# Patient Record
Sex: FEMALE | Race: BLACK OR AFRICAN AMERICAN | Employment: STUDENT | URBAN - METROPOLITAN AREA
[De-identification: names, ages, dates, MRNs, and addresses within clinical notes are randomized per-mention and may not be internally consistent; named-entity substitution may affect disease eponyms.]

---
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
## 2022-06-22 ENCOUNTER — OFFICE VISIT (OUTPATIENT)
Dept: URGENT CARE | Facility: URGENT CARE | Age: 22
End: 2022-06-22
Payer: COMMERCIAL

## 2022-06-22 VITALS
OXYGEN SATURATION: 100 % | RESPIRATION RATE: 22 BRPM | TEMPERATURE: 97.9 F | HEART RATE: 90 BPM | HEIGHT: 64 IN | BODY MASS INDEX: 39.27 KG/M2 | WEIGHT: 230 LBS

## 2022-06-22 DIAGNOSIS — J06.9 VIRAL URI: Primary | ICD-10-CM

## 2022-06-22 LAB
DEPRECATED S PYO AG THROAT QL EIA: NEGATIVE
GROUP A STREP BY PCR: NOT DETECTED

## 2022-06-22 PROCEDURE — U0003 INFECTIOUS AGENT DETECTION BY NUCLEIC ACID (DNA OR RNA); SEVERE ACUTE RESPIRATORY SYNDROME CORONAVIRUS 2 (SARS-COV-2) (CORONAVIRUS DISEASE [COVID-19]), AMPLIFIED PROBE TECHNIQUE, MAKING USE OF HIGH THROUGHPUT TECHNOLOGIES AS DESCRIBED BY CMS-2020-01-R: HCPCS | Performed by: FAMILY MEDICINE

## 2022-06-22 PROCEDURE — 87651 STREP A DNA AMP PROBE: CPT | Performed by: FAMILY MEDICINE

## 2022-06-22 PROCEDURE — U0005 INFEC AGEN DETEC AMPLI PROBE: HCPCS | Performed by: FAMILY MEDICINE

## 2022-06-22 PROCEDURE — 99203 OFFICE O/P NEW LOW 30 MIN: CPT | Mod: CS | Performed by: FAMILY MEDICINE

## 2022-06-22 NOTE — PROGRESS NOTES
Subjective: Patient woke up from a nap yesterday with a sore throat, hurts to swallow, felt a little weak last night.  Her mother is allergic to kiwi and she has had kiwi without problems in the past but she did have a popsicle with kiwi in it shortly before taking a nap.  She works in a  on the weekends, not aware of any illnesses at the .  No one at home is sick.  No cold symptoms at this point.    Objective: ENT with moderately red but not particularly swollen posterior pharynx.  Anterior nodes are a little tender.  Vitals are stable.  Strep test negative, COVID pending.    Assessment and plan: Likely a viral syndrome, rule out COVID.  Discussed what to expect.  Tylenol and ibuprofen for pain.

## 2022-06-23 LAB — SARS-COV-2 RNA RESP QL NAA+PROBE: NEGATIVE

## 2022-06-26 ENCOUNTER — NURSE TRIAGE (OUTPATIENT)
Dept: NURSING | Facility: CLINIC | Age: 22
End: 2022-06-26

## 2022-06-26 NOTE — TELEPHONE ENCOUNTER
"Headache and coughing. She thinks it's just a cold. Throat is getting worse. Test results came back normal.     Coronavirus (COVID-19) Notification    Lab Result   Lab test 2019-nCoV rRt-PCR OR SARS-COV-2 PCR    Nasopharyngeal AND/OR Oropharyngeal swab is NEGATIVE for 2019-nCoV RNA [OR] SARS-COV-2 RNA (COVID-19) RNA    Your result was negative. This means that we didn't find the virus that causes COVID-19 in your sample. A test may show negative when you do actually have the virus. This can happen when the virus is in the early stages of infection, before you feel illness symptoms.    Amanda Heard, GAIL Heard RN  [facility]    Reason for Disposition    [1] Headache AND [2] part of viral illness AND [3] present < 72 hours    Additional Information    Negative: Difficult to awaken or acting confused (e.g., disoriented, slurred speech)    Negative: [1] Weakness of the face, arm or leg on one side of the body AND [2] new onset    Negative: [1] Numbness of the face, arm or leg on one side of the body AND [2] new onset    Negative: [1] Loss of speech or garbled speech AND [2] new onset    Negative: Passed out (i.e., lost consciousness, collapsed and was not responding)    Negative: Sounds like a life-threatening emergency to the triager    Negative: Followed a head injury    Negative: Pregnant    Negative: Postpartum (from 0 to 6 weeks after delivery)    Negative: Traumatic Brain Injury (TBI) is suspected    Negative: Unable to walk, or can only walk with assistance (e.g., requires support)    Negative: Stiff neck (can't touch chin to chest)    Negative: Severe pain in one eye    Negative: [1] Other family members (or roommates) with headaches AND [2] possibility of carbon monoxide exposure    Negative: [1] SEVERE headache (e.g., excruciating) AND [2] \"worst headache\" of life     Pain at 6. Using Tylenol for throat pain.    Negative: [1] SEVERE headache AND [2] sudden-onset (i.e., reaching " maximum intensity within seconds)    Negative: [1] SEVERE headache AND [2] fever    Negative: Loss of vision or double vision (Exception: same as prior migraines)    Negative: [1] Fever > 100.0 F (37.8 C) AND [2] diabetes mellitus or weak immune system (e.g., HIV positive, cancer chemo, splenectomy, organ transplant, chronic steroids)    Negative: Patient sounds very sick or weak to the triager    Negative: [1] SEVERE headache (e.g., excruciating) AND [2] not improved after 2 hours of pain medicine    Negative: [1] Vomiting AND [2] 2 or more times (Exception: similar to previous migraines)    Negative: Fever > 104 F (40 C)    Negative: [1] MODERATE headache (e.g., interferes with normal activities) AND [2] present > 24 hours AND [3] unexplained  (Exceptions: analgesics not tried, typical migraine, or headache part of viral illness)    Negative: [1] New headache AND [2] weak immune system (e.g., HIV positive, cancer chemo, splenectomy, organ transplant, chronic steroids)    Negative: [1] New headache AND [2] age > 50    Negative: [1] Sinus pain of forehead AND [2] yellow or green nasal discharge    Negative: Fever present > 3 days (72 hours)    Negative: [1] MILD-MODERATE headache AND [2] present > 72 hours    Negative: Headache started during sex    Negative: Headache is a chronic symptom (recurrent or ongoing AND present > 4 weeks)    Negative: Similar to previously diagnosed migraine headaches    Negative: Similar to previously diagnosed muscle-tension headaches    Negative: [1] Headache AND [2] has not taken pain medications    Protocols used: HEADACHE-A-AH

## 2022-07-05 ENCOUNTER — HOSPITAL ENCOUNTER (EMERGENCY)
Facility: CLINIC | Age: 22
Discharge: HOME | End: 2022-07-05
Attending: EMERGENCY MEDICINE | Admitting: EMERGENCY MEDICINE
Payer: COMMERCIAL

## 2022-07-05 VITALS
WEIGHT: 230 LBS | SYSTOLIC BLOOD PRESSURE: 114 MMHG | HEART RATE: 111 BPM | BODY MASS INDEX: 39.48 KG/M2 | RESPIRATION RATE: 16 BRPM | DIASTOLIC BLOOD PRESSURE: 78 MMHG | TEMPERATURE: 100 F | OXYGEN SATURATION: 100 %

## 2022-07-05 DIAGNOSIS — U07.1 INFECTION DUE TO 2019 NOVEL CORONAVIRUS: ICD-10-CM

## 2022-07-05 LAB
DEPRECATED S PYO AG THROAT QL EIA: NEGATIVE
FLUAV RNA SPEC QL NAA+PROBE: NEGATIVE
FLUBV RNA RESP QL NAA+PROBE: NEGATIVE
GROUP A STREP BY PCR: NOT DETECTED
SARS-COV-2 RNA RESP QL NAA+PROBE: POSITIVE

## 2022-07-05 PROCEDURE — 99284 EMERGENCY DEPT VISIT MOD MDM: CPT | Performed by: EMERGENCY MEDICINE

## 2022-07-05 PROCEDURE — 87636 SARSCOV2 & INF A&B AMP PRB: CPT | Performed by: EMERGENCY MEDICINE

## 2022-07-05 PROCEDURE — 99283 EMERGENCY DEPT VISIT LOW MDM: CPT

## 2022-07-05 PROCEDURE — 87651 STREP A DNA AMP PROBE: CPT | Performed by: EMERGENCY MEDICINE

## 2022-07-05 PROCEDURE — C9803 HOPD COVID-19 SPEC COLLECT: HCPCS

## 2022-07-05 NOTE — Clinical Note
Chanda Champion was seen and treated in our emergency department on 7/5/2022.  She may return to work on 07/15/2022.       If you have any questions or concerns, please don't hesitate to call.      Danny Woodruff MD

## 2022-07-06 ASSESSMENT — ENCOUNTER SYMPTOMS
CHILLS: 1
COUGH: 1
VOMITING: 0
SEIZURES: 0
SHORTNESS OF BREATH: 0
APPETITE CHANGE: 1
ABDOMINAL PAIN: 0
BACK PAIN: 0
MYALGIAS: 1
DIFFICULTY URINATING: 0
EYE REDNESS: 0
HEADACHES: 1
ACTIVITY CHANGE: 1
DIARRHEA: 0
FEVER: 1
SORE THROAT: 1
NAUSEA: 0
FATIGUE: 1
CONFUSION: 0

## 2022-07-06 NOTE — ED PROVIDER NOTES
ED Provider Note  [facility]      History     Chief Complaint   Patient presents with     Cough     Reports woke up today feeling tired, started having a cough that is productive of yellow sputum, sore/red throat, fevers/chills     HPI  Chanda Champion is a 22 year old female who presents emergency department with report of feeling ill for the past 2 days, particularly bad today, tired, sleeping quietly, and cough, sore throat, fevers and chills.  Patient was seen in urgent care approximately 2 weeks ago for similar set of symptoms and had a negative work-up at that time.  Felt that she recovered and was healthy for a couple of days until she developed these symptoms on July 3.  She works at a  reports that she has had sick contacts.  She denies any nausea or vomiting, change in bladder or bowel habits.  She reports that she has vaccinated against COVID.  She reports that she has been taking ibuprofen and Tylenol to manage her symptoms at home.  She has had poor appetite, has not eaten much in the past couple of days.    Past Medical History  No past medical history on file.  Past Surgical History:   Procedure Laterality Date     TONSILLECTOMY       ibuprofen (ADVIL/MOTRIN) 200 MG tablet  nirmatrelvir and ritonavir (PAXLOVID) therapy pack      Allergies   Allergen Reactions     Sesame Oil Swelling     Family History  No family history on file.  Social History   Social History     Tobacco Use     Smoking status: Never Smoker     Smokeless tobacco: Never Used   Substance Use Topics     Alcohol use: Never     Drug use: Never      Past medical history, past surgical history, medications, allergies, family history, and social history were reviewed with the patient. No additional pertinent items.       Review of Systems   Constitutional: Positive for activity change, appetite change, chills, fatigue and fever.   HENT: Positive for congestion and sore throat.    Eyes: Negative for  redness.   Respiratory: Positive for cough. Negative for shortness of breath.    Cardiovascular: Negative for chest pain.   Gastrointestinal: Negative for abdominal pain, diarrhea, nausea and vomiting.   Genitourinary: Negative for difficulty urinating.   Musculoskeletal: Positive for myalgias. Negative for back pain.   Skin: Negative for rash.   Neurological: Positive for headaches. Negative for seizures.   Psychiatric/Behavioral: Negative for confusion.     A complete review of systems was performed with pertinent positives and negatives noted in the HPI, and all other systems negative.    Physical Exam   BP: 114/78  Pulse: 111  Temp: 100  F (37.8  C)  Resp: 16  Weight: 104.3 kg (230 lb)  SpO2: 100 %  Physical Exam  Constitutional:       General: She is awake. She is in acute distress.      Appearance: Normal appearance. She is well-developed. She is not toxic-appearing.   HENT:      Head: Atraumatic.      Mouth/Throat:      Mouth: Mucous membranes are moist.      Pharynx: Posterior oropharyngeal erythema present. No oropharyngeal exudate.   Eyes:      General: No scleral icterus.     Pupils: Pupils are equal, round, and reactive to light.   Cardiovascular:      Rate and Rhythm: Regular rhythm. Tachycardia present.      Heart sounds: Normal heart sounds, S1 normal and S2 normal.   Pulmonary:      Effort: No tachypnea, accessory muscle usage or respiratory distress.   Abdominal:      Palpations: Abdomen is soft.      Tenderness: There is no abdominal tenderness.   Musculoskeletal:         General: No tenderness.   Skin:     General: Skin is warm.      Findings: No rash.   Neurological:      Mental Status: She is alert and oriented to person, place, and time.   Psychiatric:         Attention and Perception: Attention normal.         Mood and Affect: Mood normal.         Speech: Speech normal.         Behavior: Behavior normal. Behavior is cooperative.         ED Course      Procedures                     Results for  orders placed or performed during the hospital encounter of 07/05/22   Symptomatic; Yes; 7/5/2022 Influenza A/B & SARS-CoV2 (COVID-19) Virus PCR Multiplex Nasopharyngeal     Status: Abnormal    Specimen: Nasopharyngeal; Swab   Result Value Ref Range    Influenza A PCR Negative Negative    Influenza B PCR Negative Negative    SARS CoV2 PCR Positive (A) Negative    Narrative    Testing was performed using the speedy SARS-CoV-2 & Influenza A/B Assay on the speedy [employer] System. This test should be ordered for the detection of SARS-CoV-2 and influenza viruses in individuals who meet clinical and/or epidemiological criteria. Test performance is unknown in asymptomatic patients. This test is for in vitro diagnostic use under the FDA EUA for laboratories certified under CLIA to perform moderate and/or high complexity testing. This test has not been FDA cleared or approved. A negative result does not rule out the presence of PCR inhibitors in the specimen or target RNA in concentration below the limit of detection for the assay. If only one viral target is positive but coinfection with multiple targets is suspected, the sample should be re-tested with another FDA cleared, approved or authorized test, if coinfection would change clinical management. [facility] are certified under the Clinical Laboratory Improvement Amendments of 1988 (CLIA-88) as  qualified to perform moderate and/or high complexity laboratory testing.   Streptococcus A Rapid Scr w Reflx to PCR     Status: Normal    Specimen: Throat; Swab   Result Value Ref Range    Group A Strep antigen Negative Negative   Group A Streptococcus PCR Throat Swab     Status: Normal    Specimen: Throat; Swab   Result Value Ref Range    Group A strep by PCR Not Detected Not Detected    Narrative    The Xpert Xpress Strep A test, performed on the [employer], is a rapid, qualitative in vitro diagnostic test for the detection of Streptococcus  pyogenes (Group A ß-hemolytic Streptococcus, Strep A) in throat swab specimens from patients with signs and symptoms of pharyngitis. The Xpert Xpress Strep A test can be used as an aid in the diagnosis of Group A Streptococcal pharyngitis. The assay is not intended to monitor treatment for Group A Streptococcus infections. The Xpert Xpress Strep A test utilizes an automated real-time polymerase chain reaction (PCR) to detect Streptococcus pyogenes DNA.     Medications - No data to display     Assessments & Plan (with Medical Decision Making)   Chanda Champion is a 22 year old female who presents emergency department with report of feeling ill for the past 2 days, particularly bad today, tired, sleeping quietly, and cough, sore throat, fevers and chills.  Most consistent with viral syndrome, indeed labs drawn from triage already show that the patient is COVID-positive.  This result explains her symptoms.  A review of her risk factors indicates that she is a candidate for Paxil of it.  Patient is interested, we will go ahead and prescribe.  In the meantime recommend she continue over-the-counter symptomatic treatment, push fluids.  Return precautions given.  Work note given.  Okay to discharge    I have reviewed the nursing notes. I have reviewed the findings, diagnosis, plan and need for follow up with the patient.    Discharge Medication List as of 7/5/2022 11:35 PM      START taking these medications    Details   nirmatrelvir and ritonavir (PAXLOVID) therapy pack Take 2 tablets by mouth 2 times daily for 5 days, Disp-20 tablet, R-0, Local PrintDate of symptom onset: 7/3; Risk criteria met: Yes; Positive Covid-19 test: Yes; Weight >40 kg Yes; Renal fxn: normal;  Drug-Drug interactions reviewed & addressed: Yes             Final diagnoses:   Infection due to 2019 novel coronavirus       --  Danny Woodruff MD PhD  [facility] EMERGENCY DEPARTMENT  7/5/2022     Danny Woodruff MD  07/06/22 0106

## 2022-07-06 NOTE — ED TRIAGE NOTES
Triage Assessment     Row Name 07/05/22 2007       Triage Assessment (Adult)    Airway WDL WDL       Respiratory WDL    Respiratory WDL X;cough    Cough Frequency infrequent    Cough Type productive       Cardiac WDL    Cardiac WDL WDL

## 2023-02-10 ENCOUNTER — OFFICE VISIT (OUTPATIENT)
Dept: URGENT CARE | Facility: URGENT CARE | Age: 23
End: 2023-02-10
Payer: COMMERCIAL

## 2023-02-10 VITALS
BODY MASS INDEX: 39.48 KG/M2 | RESPIRATION RATE: 18 BRPM | WEIGHT: 230 LBS | SYSTOLIC BLOOD PRESSURE: 103 MMHG | DIASTOLIC BLOOD PRESSURE: 73 MMHG | OXYGEN SATURATION: 100 % | TEMPERATURE: 99.1 F | HEART RATE: 88 BPM

## 2023-02-10 DIAGNOSIS — J06.9 VIRAL URI: Primary | ICD-10-CM

## 2023-02-10 PROCEDURE — 99213 OFFICE O/P EST LOW 20 MIN: CPT | Performed by: FAMILY MEDICINE

## 2023-02-10 NOTE — PROGRESS NOTES
Subjective: About 3 days ago she started feeling fevers and chills and then got nose congestion and a mostly dry cough.  Yesterday she did 2 negative COVID test at home and -1 again this morning.  She works at a  so she is around kids, but nothing unusual is going around.  Her left ear is a little sore today.    Objective: ENT is normal.  Neck is normal.  Lungs are clear.    Assessment and plan: Viral URI, expect it to run its course.  She said she did not think she needed a note for work but she missed yesterday and today and will go back once she has turned the corner.

## 2023-02-14 ENCOUNTER — HOSPITAL ENCOUNTER (EMERGENCY)
Facility: CLINIC | Age: 23
Discharge: HOME | End: 2023-02-14
Attending: EMERGENCY MEDICINE | Admitting: EMERGENCY MEDICINE
Payer: COMMERCIAL

## 2023-02-14 VITALS
RESPIRATION RATE: 16 BRPM | HEART RATE: 84 BPM | OXYGEN SATURATION: 100 % | WEIGHT: 234 LBS | DIASTOLIC BLOOD PRESSURE: 70 MMHG | SYSTOLIC BLOOD PRESSURE: 120 MMHG | TEMPERATURE: 98 F | BODY MASS INDEX: 40.17 KG/M2

## 2023-02-14 DIAGNOSIS — J06.9 VIRAL URI: ICD-10-CM

## 2023-02-14 LAB
FLUAV RNA SPEC QL NAA+PROBE: NEGATIVE
FLUBV RNA RESP QL NAA+PROBE: NEGATIVE
RSV RNA SPEC NAA+PROBE: NEGATIVE
SARS-COV-2 RNA RESP QL NAA+PROBE: NEGATIVE

## 2023-02-14 PROCEDURE — C9803 HOPD COVID-19 SPEC COLLECT: HCPCS | Performed by: EMERGENCY MEDICINE

## 2023-02-14 PROCEDURE — 99283 EMERGENCY DEPT VISIT LOW MDM: CPT | Mod: CS | Performed by: EMERGENCY MEDICINE

## 2023-02-14 PROCEDURE — 87637 SARSCOV2&INF A&B&RSV AMP PRB: CPT | Performed by: EMERGENCY MEDICINE

## 2023-02-14 ASSESSMENT — ACTIVITIES OF DAILY LIVING (ADL): ADLS_ACUITY_SCORE: 33

## 2023-02-14 NOTE — ED TRIAGE NOTES
Generalized body aches, chills, fever, lethargy, cough beginning last Tuesday 2/7, loss of smell and taste noticed on Thursday. Pt went to urgent care Friday- states they did not check her for covid, and sent her home with recommendation to take robitussin- pt stopped taking bc it was affecting her sleep. Pt presents to day for continuation of symptoms.      Triage Assessment     Row Name 02/14/23 8038       Triage Assessment (Adult)    Airway WDL WDL       Respiratory WDL    Respiratory WDL X;cough    Cough Frequency frequent    Cough Type congested       Skin Circulation/Temperature WDL    Skin Circulation/Temperature WDL WDL       Cardiac WDL    Cardiac WDL WDL       Peripheral/Neurovascular WDL    Peripheral Neurovascular WDL WDL       Cognitive/Neuro/Behavioral WDL    Cognitive/Neuro/Behavioral WDL WDL

## 2023-02-15 NOTE — ED PROVIDER NOTES
ED Provider Note  [facility]      History     Chief Complaint   Patient presents with     Cold Symptoms     Generalized body aches, chills, fever, lethargy, cough beginning last Tuesday 2/7, loss of smell and taste noticed on Thursday. Pt went to urgent care Friday- states they did not check her for covid, and sent her home with recommendation to take robitussin- pt stopped taking bc it was affecting her sleep. Pt presents to day for continuation of symptoms.      HPI  Chanda Champion is a 22 year old female who presents to the ED today with 7 days of URI symptoms.  Patient is noticing nasal congestion, dry throat, loss of taste and smell, and cough which is occasionally and intermittently productive of white mucus.  She was seen at an urgent care 4 days ago and was told she very likely had a cold virus.  She was not tested for COVID.  She continues to have symptoms and is concerned that she could have COVID and came to the emergency department.  She did take an at home COVID test on Friday, 4 days ago, and this was negative.  She has not received the most recent updated bivalent COVID booster (most recent Covid vaccine was 6/2021).  She has not been vaccinated for influenza this year.    She denies any fevers but has felt chilled.  No shortness of breath or chest pain.  No abdominal pain, no nausea or vomiting.  She had diarrhea for 2 days at the onset of symptoms but this has since resolved.  No hematuria or dysuria. She has had some body aches.     She has a history of COVID infection in July 2022.    Past Medical History  No past medical history on file.  Past Surgical History:   Procedure Laterality Date     TONSILLECTOMY       ibuprofen (ADVIL/MOTRIN) 200 MG tablet      Allergies   Allergen Reactions     Sesame Oil Swelling     Family History  No family history on file.  Social History   Social History     Tobacco Use     Smoking status: Never     Smokeless tobacco: Never    Substance Use Topics     Alcohol use: Never     Drug use: Never         A medically appropriate review of systems was performed with pertinent positives and negatives noted in the HPI, and all other systems negative.    Physical Exam   BP: 117/71  Pulse: 86  Temp: 98.8  F (37.1  C)  Resp: 18  Weight: 106.1 kg (234 lb)  SpO2: 99 %  Physical Exam  Vitals and nursing note reviewed.   Constitutional:       General: She is not in acute distress.     Appearance: She is not diaphoretic.      Comments: Adult female, alert, cooperative, NAD.     HENT:      Head: Atraumatic.      Mouth/Throat:      Mouth: Mucous membranes are moist.      Pharynx: Oropharynx is clear. No oropharyngeal exudate.   Eyes:      General: No scleral icterus.     Pupils: Pupils are equal, round, and reactive to light.   Cardiovascular:      Rate and Rhythm: Normal rate.      Pulses: Normal pulses.      Heart sounds: Normal heart sounds. No murmur heard.  Pulmonary:      Effort: No respiratory distress.      Breath sounds: Normal breath sounds.   Abdominal:      General: Bowel sounds are normal.      Palpations: Abdomen is soft.      Tenderness: There is no abdominal tenderness.   Musculoskeletal:         General: No tenderness.   Skin:     General: Skin is warm.      Findings: No rash.   Neurological:      Mental Status: She is alert.           ED Course, Procedures, & Data      Procedures               Results for orders placed or performed during the hospital encounter of 02/14/23   Symptomatic Influenza A/B & SARS-CoV2 (COVID-19) Virus PCR Multiplex Nose     Status: Normal    Specimen: Nose; Swab   Result Value Ref Range    Influenza A PCR Negative Negative    Influenza B PCR Negative Negative    RSV PCR Negative Negative    SARS CoV2 PCR Negative Negative    Narrative    Testing was performed using the Xpert Xpress CoV2/Flu/RSV Assay on the C[employer]heid GeneXpert Instrument. This test should be ordered for the detection of SARS-CoV-2 and influenza  viruses in individuals who meet clinical and/or epidemiological criteria. Test performance is unknown in asymptomatic patients. This test is for in vitro diagnostic use under the FDA EUA for laboratories certified under CLIA to perform high or moderate complexity testing. This test has not been FDA cleared or approved. A negative result does not rule out the presence of PCR inhibitors in the specimen or target RNA in concentration below the limit of detection for the assay. If only one viral target is positive but coinfection with multiple targets is suspected, the sample should be re-tested with another FDA cleared, approved, or authorized test, if coinfection would change clinical management. This test was validated by the [facility]. These laboratories are certified under the Clinical Laboratory Improvement Amendments of 1988 (CLIA-88) as qualified to perform high complexity laboratory testing.     Medications - No data to display  Labs Ordered and Resulted from Time of ED Arrival to Time of ED Departure   INFLUENZA A/B & SARS-COV2 PCR MULTIPLEX - Normal       Result Value    Influenza A PCR Negative      Influenza B PCR Negative      RSV PCR Negative      SARS CoV2 PCR Negative       No orders to display          Medical Decision Making  The patient's presentation is strongly suggestive of a clearly self-limited or minor problem.    The patient's evaluation involved:  review of external note(s) from 1 sources (see separate area of note for details)  ordering and/or review of 2 test(s) in this encounter (see separate area of note for details)    The patient's management involved only low risk treatment.      Assessment & Plan    Patient presents to the emergency department today for the above complaints.  Based on the timing of her symptoms, with URI symptoms going on for 1 week, suspect likely viral URI.  COVID is possible and we did check a SARS-CoV-2 PCR swab which is negative.  Influenza  is negative.  Patient will be advised on symptomatic care and instructed to follow-up with primary clinic as needed.    I have reviewed the nursing notes. I have reviewed the findings, diagnosis, plan and need for follow up with the patient.    New Prescriptions    No medications on file       Final diagnoses:   Viral URI       Raeann Panda MD  [facility] EMERGENCY DEPARTMENT  2/14/2023     Raeann Panda MD  02/14/23 [id number]

## 2023-02-15 NOTE — DISCHARGE INSTRUCTIONS
"You have been seen in the ER today for an upper respiratory infection. Your Covid swab is negative. Your flu swab is negative. You have simple viral upper respiratory infection (a \"cold\"). We recommend that you use over the counter medicines as needed, such as tylenol or motrin to help with body aches, or sudafed to help with nasal congestion. You do not need a prescription for these medicines. Follow up with your clinic as needed.   "

## 2023-06-19 ENCOUNTER — HOSPITAL ENCOUNTER (EMERGENCY)
Facility: CLINIC | Age: 23
Discharge: HOME | End: 2023-06-19
Attending: EMERGENCY MEDICINE | Admitting: EMERGENCY MEDICINE
Payer: COMMERCIAL

## 2023-06-19 VITALS
SYSTOLIC BLOOD PRESSURE: 131 MMHG | TEMPERATURE: 98.5 F | WEIGHT: 220 LBS | RESPIRATION RATE: 18 BRPM | HEART RATE: 85 BPM | DIASTOLIC BLOOD PRESSURE: 85 MMHG | BODY MASS INDEX: 37.56 KG/M2 | HEIGHT: 64 IN | OXYGEN SATURATION: 100 %

## 2023-06-19 DIAGNOSIS — R55 SYNCOPE, UNSPECIFIED SYNCOPE TYPE: ICD-10-CM

## 2023-06-19 LAB
ANION GAP SERPL CALCULATED.3IONS-SCNC: 13 MMOL/L (ref 7–15)
ATRIAL RATE - MUSE: 77 BPM
BASOPHILS # BLD AUTO: 0 10E3/UL (ref 0–0.2)
BASOPHILS NFR BLD AUTO: 1 %
BUN SERPL-MCNC: 10.9 MG/DL (ref 6–20)
CALCIUM SERPL-MCNC: 9.7 MG/DL (ref 8.6–10)
CHLORIDE SERPL-SCNC: 103 MMOL/L (ref 98–107)
CREAT SERPL-MCNC: 0.69 MG/DL (ref 0.51–0.95)
DEPRECATED HCO3 PLAS-SCNC: 23 MMOL/L (ref 22–29)
DIASTOLIC BLOOD PRESSURE - MUSE: NORMAL MMHG
EOSINOPHIL # BLD AUTO: 0 10E3/UL (ref 0–0.7)
EOSINOPHIL NFR BLD AUTO: 0 %
ERYTHROCYTE [DISTWIDTH] IN BLOOD BY AUTOMATED COUNT: 12.9 % (ref 10–15)
GFR SERPL CREATININE-BSD FRML MDRD: >90 ML/MIN/1.73M2
GLUCOSE SERPL-MCNC: 98 MG/DL (ref 70–99)
HCG SERPL QL: NEGATIVE
HCT VFR BLD AUTO: 43.7 % (ref 35–47)
HGB BLD-MCNC: 14.3 G/DL (ref 11.7–15.7)
IMM GRANULOCYTES # BLD: 0 10E3/UL
IMM GRANULOCYTES NFR BLD: 0 %
INTERPRETATION ECG - MUSE: NORMAL
LYMPHOCYTES # BLD AUTO: 1 10E3/UL (ref 0.8–5.3)
LYMPHOCYTES NFR BLD AUTO: 13 %
MCH RBC QN AUTO: 30.8 PG (ref 26.5–33)
MCHC RBC AUTO-ENTMCNC: 32.7 G/DL (ref 31.5–36.5)
MCV RBC AUTO: 94 FL (ref 78–100)
MONOCYTES # BLD AUTO: 0.7 10E3/UL (ref 0–1.3)
MONOCYTES NFR BLD AUTO: 9 %
NEUTROPHILS # BLD AUTO: 6 10E3/UL (ref 1.6–8.3)
NEUTROPHILS NFR BLD AUTO: 77 %
NRBC # BLD AUTO: 0 10E3/UL
NRBC BLD AUTO-RTO: 0 /100
P AXIS - MUSE: 64 DEGREES
PLATELET # BLD AUTO: 219 10E3/UL (ref 150–450)
POTASSIUM SERPL-SCNC: 3.4 MMOL/L (ref 3.4–5.3)
PR INTERVAL - MUSE: 134 MS
QRS DURATION - MUSE: 86 MS
QT - MUSE: 352 MS
QTC - MUSE: 398 MS
R AXIS - MUSE: 35 DEGREES
RBC # BLD AUTO: 4.65 10E6/UL (ref 3.8–5.2)
SODIUM SERPL-SCNC: 139 MMOL/L (ref 136–145)
SYSTOLIC BLOOD PRESSURE - MUSE: NORMAL MMHG
T AXIS - MUSE: 30 DEGREES
VENTRICULAR RATE- MUSE: 77 BPM
WBC # BLD AUTO: 7.8 10E3/UL (ref 4–11)

## 2023-06-19 PROCEDURE — 85025 COMPLETE CBC W/AUTO DIFF WBC: CPT | Performed by: EMERGENCY MEDICINE

## 2023-06-19 PROCEDURE — 82310 ASSAY OF CALCIUM: CPT | Performed by: EMERGENCY MEDICINE

## 2023-06-19 PROCEDURE — 84703 CHORIONIC GONADOTROPIN ASSAY: CPT | Performed by: EMERGENCY MEDICINE

## 2023-06-19 PROCEDURE — 93005 ELECTROCARDIOGRAM TRACING: CPT

## 2023-06-19 PROCEDURE — 36415 COLL VENOUS BLD VENIPUNCTURE: CPT | Performed by: EMERGENCY MEDICINE

## 2023-06-19 PROCEDURE — 99284 EMERGENCY DEPT VISIT MOD MDM: CPT

## 2023-06-19 NOTE — ED PROVIDER NOTES
"  History     Chief Complaint:  Syncope       The history is provided by the patient and a friend.      Chanda Champion is a 23 year old, previously healthy, female who presents following a syncopal episode. The patient reports that she was outside at a  in [facility], when she began feeling dizzy and sat down to have some water. She felt dizzy for the rest of the  and when she stood up at the end, she lost consciousness. She denies chest pain or shortness of breath,She notes that she had breakfast this morning, but had no food after breakfast. She denies any drinking or smoking. The patient states there is no chance of pregnancy.     Independent Historian:   Friend - They report information as given above.     Review of External Notes:   I reviewed previous urgent care visit notes.     Medications:    The patient is currently on no regular medications.    Past Medical History:    History reviewed; no pertinent past medical history.    Past Surgical History:    Tonsillectomy     Physical Exam     Patient Vitals for the past 24 hrs:   BP Temp Temp src Pulse Resp SpO2 Height Weight   23 1541 131/85 98.5  F (36.9  C) Temporal 85 18 100 % 1.626 m (5' 4\") 99.8 kg (220 lb)      Physical Exam  General: Alert, interactive  Head:  Scalp is atraumatic  Eyes:  The pupils are equal, round, and reactive to light    EOM's intact    No scleral icterus  ENT:      Nose:  The external nose is normal  Ears:  External ears are normal  Mouth/Throat: The oropharynx is normal    Mucus membranes are dry      Neck:  Normal range of motion.      There is no rigidity.    Trachea is in the midline         CV:  Regular rate and rhythm    No murmur   Resp:  Breath sounds are clear bilaterally    Non-labored, no retractions or accessory muscle use      GI:  Abdomen is soft, no distension, no tenderness.       MS:  Normal strength in all 4 extremities  Skin:  Warm and dry, No rash or lesions noted.  Neuro: Strength " 5/5 x4.     GCS: 15  Psych:  Awake. Alert.  Normal affect.      Appropriate interactions.    Emergency Department Course     ECG results from 06/19/23   EKG 12-lead, tracing only     Value    Systolic Blood Pressure     Diastolic Blood Pressure     Ventricular Rate 77    Atrial Rate 77    MT Interval 134    QRS Duration 86        QTc 398    P Axis 64    R AXIS 35    T Axis 30    Interpretation ECG      Sinus rhythm  Normal ECG  No previous ECGs available     Interpreted by Jose Myers MD    Laboratory:  Labs Ordered and Resulted from Time of ED Arrival to Time of ED Departure   BASIC METABOLIC PANEL - Normal       Result Value    Sodium 139      Potassium 3.4      Chloride 103      Carbon Dioxide (CO2) 23      Anion Gap 13      Urea Nitrogen 10.9      Creatinine 0.69      Calcium 9.7      Glucose 98      GFR Estimate >90     HCG QUALITATIVE PREGNANCY - Normal    hCG Serum Qualitative Negative     CBC WITH PLATELETS AND DIFFERENTIAL    WBC Count 7.8      RBC Count 4.65      Hemoglobin 14.3      Hematocrit 43.7      MCV 94      MCH 30.8      MCHC 32.7      RDW 12.9      Platelet Count 219      % Neutrophils 77      % Lymphocytes 13      % Monocytes 9      % Eosinophils 0      % Basophils 1      % Immature Granulocytes 0      NRBCs per 100 WBC 0      Absolute Neutrophils 6.0      Absolute Lymphocytes 1.0      Absolute Monocytes 0.7      Absolute Eosinophils 0.0      Absolute Basophils 0.0      Absolute Immature Granulocytes 0.0      Absolute NRBCs 0.0        Procedures   None     Emergency Department Course & Assessments:     Interventions:  Medications - No data to display     Independent Interpretation (X-rays, CTs, rhythm strip):  None    Assessments/Consultations/Discussion of Management or Tests:  ED Course as of 06/19/23 1824 Mon Jun 19, 2023   6041 I obtained the patient's history.      Social Determinants of Health affecting care:   Stress response     Disposition:  The patient was  discharged to home.     Impression & Plan    Medical Decision Making:  Following presentation history and physical examination were performed, the above work-up was undertaken thankfully returning is unremarkable.  I believe the patient's syncope is likely a combination of the stressful environment as well as heat.  There is no signs of a more sinister etiology including cardiac dysrhythmia, acute coronary syndrome, pulmonary embolism.  She has a normal neurologic exam and I doubt stroke or intracranial hemorrhage.  She was feeling improved was tolerating p.o. intake in the emergency department and was subsequently discharged home.    Diagnosis:    ICD-10-CM    1. Syncope, unspecified syncope type  R55            Discharge Medications:  Discharge Medication List as of 6/19/2023  5:28 PM         Scribe Disclosure:  I, Helen Jo, am serving as a scribe at 5:03 PM on 6/19/2023 to document services personally performed by Jose Myers MD, based on my observations and the provider's statements to me.  6/19/2023   Jose Myers MD Trigger, Brandon Thomas, MD  06/19/23 9828

## 2023-06-19 NOTE — ED NOTES
"Rapid Assessment Note    History:   Chanda Champion is a 23 year old female who presents with a syncopal episode. The patient reports that she was outside at a  [place] [facility] when she began feeling dizzy and sat down to have some water. She felt dizzy for the rest of the  and when she stood up at the end, she lost consciousness. She denies chest pain or shortness of breath,She notes that she had breakfast this morning, but had no food after breakfast. She denies any drinking or smoking. The patient states there is no chance of pregnancy.     /85   Pulse 85   Temp 98.5  F (36.9  C) (Temporal)   Resp 18   Ht 1.626 m (5' 4\")   Wt 99.8 kg (220 lb)   SpO2 100%   BMI 37.76 kg/m       Exam:   General:  Alert, interactive  Cardiovascular:  Well perfused  Lungs:  No respiratory distress, no accessory muscle use  Neuro:  Moving all 4 extremities  Skin:  Warm, dry  Psych:  Normal affect      Plan of Care:   I evaluated the patient and developed an initial plan of care. I discussed this plan and explained that I, or one of my partners, would be returning to complete the evaluation.     I, Helen Jo, am serving as a scribe to document services personally performed by Jose Myers MD, based on my observations and the provider's statements to me.    2023  [employer]    Portions of this medical record were completed by a scribe. UPON MY REVIEW AND AUTHENTICATION BY ELECTRONIC SIGNATURE, this confirms (a) I performed the applicable clinical services, and (b) the record is accurate.      Jose Myers MD  23 1545    "

## 2023-06-19 NOTE — ED TRIAGE NOTES
Pt presents for near syncope episode via EMS. Per EMS report: at  service, began to feel weak. Lowered self to the ground. Per EMS, no LOC. EMS did not obtain blood sugar or IV access.

## 2023-08-01 ENCOUNTER — OFFICE VISIT (OUTPATIENT)
Dept: URGENT CARE | Facility: URGENT CARE | Age: 23
End: 2023-08-01
Payer: COMMERCIAL

## 2023-08-01 VITALS
DIASTOLIC BLOOD PRESSURE: 82 MMHG | TEMPERATURE: 98.1 F | OXYGEN SATURATION: 99 % | HEART RATE: 90 BPM | WEIGHT: 220 LBS | BODY MASS INDEX: 37.76 KG/M2 | SYSTOLIC BLOOD PRESSURE: 117 MMHG

## 2023-08-01 DIAGNOSIS — R07.0 THROAT PAIN: Primary | ICD-10-CM

## 2023-08-01 DIAGNOSIS — R09.82 POST-NASAL DRIP: ICD-10-CM

## 2023-08-01 DIAGNOSIS — R09.81 CONGESTION OF PARANASAL SINUS: ICD-10-CM

## 2023-08-01 LAB
DEPRECATED S PYO AG THROAT QL EIA: NEGATIVE
GROUP A STREP BY PCR: NOT DETECTED

## 2023-08-01 PROCEDURE — 87635 SARS-COV-2 COVID-19 AMP PRB: CPT | Performed by: FAMILY MEDICINE

## 2023-08-01 PROCEDURE — 99213 OFFICE O/P EST LOW 20 MIN: CPT | Performed by: FAMILY MEDICINE

## 2023-08-01 PROCEDURE — 87651 STREP A DNA AMP PROBE: CPT | Performed by: FAMILY MEDICINE

## 2023-08-01 NOTE — PATIENT INSTRUCTIONS
Follow up if  symptoms fail to improve or worsens   Pt understood and agreed with plan       Amber Ghotra MD

## 2023-08-01 NOTE — PROGRESS NOTES
Chief Complaint   Patient presents with    Urgent Care    Sinus Problem     C/O sinus infection for 1 day         Chanda was seen today for urgent care and sinus problem.    Diagnoses and all orders for this visit:    Throat pain  -     Streptococcus A Rapid Screen w/Reflex to PCR - Clinic Collect  -     Group A Streptococcus PCR Throat Swab    Congestion of paranasal sinus  -     Asymptomatic COVID-19 Virus (Coronavirus) by PCR Nose  -     loratadine-pseudoePHEDrine (CLARITIN-D 12-HOUR) 5-120 MG 12 hr tablet; Take 1 tablet by mouth 2 times daily    Post-nasal drip  -     loratadine-pseudoePHEDrine (CLARITIN-D 12-HOUR) 5-120 MG 12 hr tablet; Take 1 tablet by mouth 2 times daily      Results for orders placed or performed in visit on 08/01/23   Streptococcus A Rapid Screen w/Reflex to PCR - Clinic Collect     Status: Normal    Specimen: Throat; Swab   Result Value Ref Range    Group A Strep antigen Negative Negative       PLAN:  See orders  Follow up with primary clinic if not improving  Use vaporizer to help with the coughing symptoms continue symptomatic treatment if notices fever above 102 with shortness of breath fever chills or chest pain should follow-up for further evaluation and treatment COVID test result is pending.  Patient was also given a note for work.  Can do Tylenol to help with the pain.  As chest was clear there was no Rales rhonchi noted no chest x-ray was needed or CBC was needed at this point.      SUBJECTIVE:  Chanda Champion is a 23 year old female here with concerns about sinus congestion , runny nose and also sorethroat along with some coughing  She states onset of symptoms were 1 day(s) ago.  She has had maxillary pressure. Course of illness is same. Severity moderate  Current and Associated symptoms: nasal congestion, sore throat, and post-nasal drainage  Predisposing factors include none. Recent treatment has included: None    No past medical history on file.  Social History     Tobacco Use     Smoking status: Never     Passive exposure: Never    Smokeless tobacco: Never   Substance Use Topics    Alcohol use: Never       ROS:  Review of systems negative except as stated above.    OBJECTIVE:  /82   Pulse 90   Temp 98.1  F (36.7  C) (Tympanic)   Wt 99.8 kg (220 lb)   LMP 06/26/2023   SpO2 99%   BMI 37.76 kg/m    Exam:GENERAL APPEARANCE: healthy, alert and no distress  EYES: EOMI,  PERRL, conjunctiva clear  HENT: ear canals and TM's normal.  Nose - nasal congestion noted with discharge and mouth without ulcers, erythema or lesions  NECK: supple, nontender, no lymphadenopathy  RESP: lungs clear to auscultation - no rales, rhonchi or wheezes  CV: regular rates and rhythm, normal S1 S2, no murmur noted  SKIN: no suspicious lesions or rashes  PSYCH: mentation appears normal      Amber Ghotra MD

## 2023-08-01 NOTE — LETTER
August 1, 2023      Chanda Champion  [address] [facility] [id number]        To Whom It May Concern:    Chanda Champion was seen in our clinic 8/1/2023 she may return to work and school without restrictions on 8/2/2023 only if she is fever free for 24 hours and also COVID test result is negative.      Sincerely,        Amber Ghotra MD

## 2023-08-01 NOTE — LETTER
August 1, 2023      Chanda Champion  [address] [facility] [address]        To Whom It May Concern:    Chanda Champion was seen in our clinic for current illness .she may return to work without restrictions on 8/2/2023 only if COVID test result is negative and she is fever free for 24 hours without fever reducers      Sincerely,        Amber Ghotra MD           22-Aug-2017

## 2023-08-02 ENCOUNTER — TELEPHONE (OUTPATIENT)
Dept: NURSING | Facility: CLINIC | Age: 23
End: 2023-08-02

## 2023-08-02 ENCOUNTER — NURSE TRIAGE (OUTPATIENT)
Dept: NURSING | Facility: CLINIC | Age: 23
End: 2023-08-02

## 2023-08-02 ENCOUNTER — VIRTUAL VISIT (OUTPATIENT)
Dept: FAMILY MEDICINE | Facility: CLINIC | Age: 23
End: 2023-08-02
Payer: COMMERCIAL

## 2023-08-02 DIAGNOSIS — U07.1 INFECTION DUE TO 2019 NOVEL CORONAVIRUS: Primary | ICD-10-CM

## 2023-08-02 DIAGNOSIS — E66.9 OBESITY (BMI 35.0-39.9 WITHOUT COMORBIDITY): ICD-10-CM

## 2023-08-02 LAB — SARS-COV-2 RNA RESP QL NAA+PROBE: POSITIVE

## 2023-08-02 PROCEDURE — 99213 OFFICE O/P EST LOW 20 MIN: CPT | Mod: VID | Performed by: FAMILY MEDICINE

## 2023-08-02 NOTE — PROGRESS NOTES
"Chanda is a 23 year old who is being evaluated via a billable video visit.      How would you like to obtain your AVS? MyChart  If the video visit is dropped, the invitation should be resent by: Text to cell phone: [phone]  Will anyone else be joining your video visit? No          Assessment & Plan     Infection due to 2019 novel coronavirus  - nirmatrelvir and ritonavir (PAXLOVID) 300 mg/100 mg therapy pack  Dispense: 30 tablet; Refill: 0  - Patient education and Handout with home care instructions given. See AVS for details.      Obesity (BMI 35.0-39.9 without comorbidity)  - Weight management plan: Discussed healthy diet and exercise guidelines        BMI:   Estimated body mass index is 37.76 kg/m  as calculated from the following:    Height as of 6/19/23: 1.626 m (5' 4\").    Weight as of 8/1/23: 99.8 kg (220 lb).   Weight management plan: Discussed healthy diet and exercise guidelines    COVID-19 positive patient.  Encounter for consideration of medication intervention. Patient does qualify for a prescription. Full discussion with patient including medication options, risks and benefits. Potential drug interactions reviewed with patient.     Treatment Planned Paxlovid, Rx sent to patient's preferred pharmacy.    Temporary change to home medications:  None     Estimated body mass index is 37.76 kg/m  as calculated from the following:    Height as of 6/19/23: 1.626 m (5' 4\").    Weight as of 8/1/23: 99.8 kg (220 lb).  GFR Estimate   Date Value Ref Range Status   06/19/2023 >90 >60 mL/min/1.73m2 Final     Comment:     eGFR calculated using 2021 CKD-EPI equation.     Lab Results   Component Value Date    [id number] Positive (A) 08/01/2023     Return in 1 week (on 8/9/2023), or if symptoms worsen or fail to improve.    Ivy Regalado MD  [facility] CHANDLER Armas is a 23 year old, presenting for the following health issues:  Covid Concern (Positive 08/01/2023)        8/2/2023     " 2:13 PM   Additional Questions   Roomed by Gregory AGUDELO   Accompanied by FAYE         8/2/2023     2:13 PM   Patient Reported Additional Medications   Patient reports taking the following new medications NA       HPI       COVID-19 Symptom Review  How many days ago did these symptoms start? 07/31/2023    Are any of the following symptoms significant for you?  New or worsening difficulty breathing? No  Worsening cough? Yes, I am coughing up mucus.  Fever or chills? No but having sweats  Headache: No, lightheaded  Sore throat: No  Chest pain: No  Diarrhea: No  Body aches? No    What treatments has patient tried? Ibuprofen   Does patient live in a nursing home, group home, or shelter? No  Does patient have a way to get food/medications during quarantined? Yes, I have a friend or family member who can help me.      Patient seen at Urgent care yesterday.   Tested positive for COVID. Still having symptoms as described above.     Risk factors- Obesity.   Wt Readings from Last 4 Encounters:   08/01/23 99.8 kg (220 lb)   06/19/23 99.8 kg (220 lb)   02/14/23 106.1 kg (234 lb)   02/10/23 104.3 kg (230 lb)       Review of Systems   Constitutional, HEENT, cardiovascular, pulmonary, gi and gu systems are negative, except as otherwise noted.      Objective           Vitals:  No vitals were obtained today due to virtual visit.    Physical Exam   GENERAL: Healthy, alert and no distress  EYES: Eyes grossly normal to inspection.  No discharge or erythema, or obvious scleral/conjunctival abnormalities.  RESP: No audible wheeze, cough, or visible cyanosis.  No visible retractions or increased work of breathing.    SKIN: Visible skin clear. No significant rash, abnormal pigmentation or lesions.  NEURO: Cranial nerves grossly intact.  Mentation and speech appropriate for age.  PSYCH: Mentation appears normal, affect normal/bright, judgement and insight intact, normal speech and appearance well-groomed.    DATA  Component      Latest Ref Rng  8/1/2023  10:40 AM   SARS CoV2 PCR      Negative  Positive !               Video-Visit Details    Type of service:  Video Visit   Video Start Time:  3:30pm  Video End Time: 3:45 pm     Originating Location (pt. Location): Home  Distant Location (provider location):  On-site  Platform used for Video Visit: Andres

## 2023-08-02 NOTE — TELEPHONE ENCOUNTER
Coronavirus (COVID-19) Notification    Caller Name (Patient, parent, daughter/son, grandparent, etc)  Chanda    Reason for call  Notify of Positive Coronavirus (COVID-19) lab results, assess symptoms,  review [facility] recommendations    Lab Result    Lab test:  2019-nCoV rRt-PCR or SARS-CoV-2 PCR    Oropharyngeal AND/OR nasopharyngeal swabs is POSITIVE for 2019-nCoV RNA/SARS-COV-2 PCR (COVID-19 virus)      Gather patient reported symptoms   Assessment   Current Symptoms at time of phone call, reported by patient: (if no symptoms, document: No symptoms]    Date of symptom(s) onset (if applicable)      If at time of call, Patients symptoms have worsened, the Patient should contact 911 or have someone drive them to Emergency Dept promptly:    If Patient calling 911, inform 911 personal that you have tested positive for the Coronavirus (COVID-19).  Place mask on and await 911 to arrive.  If Emergency Dept, If possible, please have another adult drive you to the Emergency Dept but you need to wear mask when in contact with other people.      Treatment Options:   Is patient interested in discussing COVID treatment? Yes.   Is this a [place] or [place] Patient? No:     Are you an agent trained to scheduling? Yes.       Review information with Patient    Your result was positive. This means you have COVID-19 (coronavirus).    How can I protect others?    These guidelines are for isolating before returning to work, school or .    If you DO have symptoms  Stay home and away from others   For at least 5 days after your symptoms started, AND  You are fever free for 24 hours (with no medicine that reduces fever), AND  Your other symptoms are better  Wear a mask for 10 full days anytime you are around others    If you DON'T have symptoms  Stay home and away from others for at least 5 days after your positive test  Wear a mask for 10 full days anytime you are around others    There may be different guidelines for  healthcare facilities.  Please check with the specific sites before arriving.    If you have been told by a doctor that you were severely ill with COVID-19 or are immunocompromised, you should isolate for at least 10 days.    You should not go back to work until you meet the guidelines above for ending your home isolation. You don't need to be retested for COVID-19 before going back to work--studies show that you won't spread the virus if it's been at least 10 days since your symptoms started (or 20 days, if you have a weak immune system).    Employers, schools, and daycares: This is an official notice for this person's medical guidelines for returning in-person.  They must meet the above guidelines before going back to work, school or  in person.    You will receive a positive COVID-19 letter via [employer] or the mail soon with additional self-care information.    Would you like me to review some of that information with you now?  No    If you were tested for an upcoming procedure, please contact your provider for next steps.    Angelito Reyes

## 2023-08-02 NOTE — TELEPHONE ENCOUNTER
Reason for Disposition   [1] COVID-19 diagnosed by positive lab test (e.g., PCR, rapid self-test kit) AND [2] mild symptoms (e.g., cough, fever, others) AND [3] no complications or SOB    Additional Information   Negative: SEVERE difficulty breathing (e.g., struggling for each breath, speaks in single words)   Negative: Difficult to awaken or acting confused (e.g., disoriented, slurred speech)   Negative: Bluish (or gray) lips or face now   Negative: Shock suspected (e.g., cold/pale/clammy skin, too weak to stand, low BP, rapid pulse)   Negative: Sounds like a life-threatening emergency to the triager   Negative: SEVERE or constant chest pain or pressure  (Exception: Mild central chest pain, present only when coughing.)   Negative: MODERATE difficulty breathing (e.g., speaks in phrases, SOB even at rest, pulse 100-120)   Negative: Headache and stiff neck (can't touch chin to chest)   Negative: Oxygen level (e.g., pulse oximetry) 90 percent or lower   Negative: Chest pain or pressure  (Exception: MILD central chest pain, present only when coughing)   Negative: Patient sounds very sick or weak to the triager   Negative: MILD difficulty breathing (e.g., minimal/no SOB at rest, SOB with walking, pulse <100)   Negative: Fever > 103 F (39.4 C)   Negative: [1] Fever > 101 F (38.3 C) AND [2] over 60 years of age   Negative: [1] Fever > 100.0 F (37.8 C) AND [2] bedridden (e.g., nursing home patient, CVA, chronic illness, recovering from surgery)   Negative: [1] HIGH RISK for severe COVID complications (e.g., weak immune system, age > 64 years, obesity with BMI of 30 or higher, pregnant, chronic lung disease or other chronic medical condition) AND [2] COVID symptoms (e.g., cough, fever)  (Exceptions: Already seen by PCP and no new or worsening symptoms.)   Negative: [1] HIGH RISK patient AND [2] influenza is widespread in the community AND [3] ONE OR MORE respiratory symptoms: cough, sore throat, runny or stuffy nose    Negative: [1] HIGH RISK patient AND [2] influenza exposure within the last 7 days AND [3] ONE OR MORE respiratory symptoms: cough, sore throat, runny or stuffy nose   Negative: Oxygen level (e.g., pulse oximetry) 91 to 94 percent   Negative: [1] COVID-19 infection suspected by caller or triager AND [2] mild symptoms (cough, fever, or others) AND [3] negative COVID-19 rapid test   Negative: Fever present > 3 days (72 hours)   Negative: [1] Fever returns after gone for over 24 hours AND [2] symptoms worse or not improved   Negative: [1] Continuous (nonstop) coughing interferes with work or school AND [2] no improvement using cough treatment per Care Advice   Negative: Cough present > 3 weeks   Negative: [1] COVID-19 diagnosed by positive lab test (e.g., PCR, rapid self-test kit) AND [2] NO symptoms (e.g., cough, fever, others)    Protocols used: Coronavirus (COVID-19) Diagnosed or [id number]    Monday.  Nurse Triage SBAR    Is this a 2nd Level Triage? NO    Pt tested + of COV yesterday.  She has some congestion. Slight cough.  Pt is asking for a note for school.  Advised most schools do not require documentation.  She can email her professor.  The lab results will also be in my chart.  So she can send a picture of the result be sure to black out her MRN etc.     Protocol Recommended Disposition:   Home Care    Recommendation: home care       Misti Su RN on 8/2/2023 at 1:54 PM

## 2023-09-16 ENCOUNTER — HEALTH MAINTENANCE LETTER (OUTPATIENT)
Age: 23
End: 2023-09-16

## 2023-11-03 ENCOUNTER — TELEPHONE (OUTPATIENT)
Dept: FAMILY MEDICINE | Facility: CLINIC | Age: 23
End: 2023-11-03
Payer: COMMERCIAL

## 2023-11-03 NOTE — LETTER
November 22, 2023    To  Chanda Champion  [address]  [facility] [address]    Your team at [facility] cares about your health. We have reviewed your chart and based on our findings; we are making the following recommendations to better manage your health.     You are in particular need of attention regarding the following:     Schedule a primary care office visit with your provider for a Pap Smear to screen for Cervical Cancer.  PREVENTATIVE VISIT: Physical    If you have already completed these items, please contact the clinic via phone or   MyChart so your care team can review and update your records. Thank you for   choosing [facility] for your healthcare needs. For any questions,   concerns, or to schedule an appointment please contact our clinic.    Healthy Regards,      Your [facility] Care Team

## 2023-11-17 NOTE — TELEPHONE ENCOUNTER
Patient Quality Outreach        Type of outreach:    Sent Training Advisor message.      Questions for provider review:    None           Gregory Collins CMA  Chart routed to Provider.

## 2023-11-22 NOTE — TELEPHONE ENCOUNTER
Patient Quality Outreach        Type of outreach:    Sent letter.      Questions for provider review:    None           Gregory Collins, JAMMIE  Chart routed to Provider.       no

## 2023-11-28 ENCOUNTER — OFFICE VISIT (OUTPATIENT)
Dept: URGENT CARE | Facility: URGENT CARE | Age: 23
End: 2023-11-28
Payer: COMMERCIAL

## 2023-11-28 VITALS
BODY MASS INDEX: 37.76 KG/M2 | WEIGHT: 220 LBS | SYSTOLIC BLOOD PRESSURE: 107 MMHG | DIASTOLIC BLOOD PRESSURE: 67 MMHG | HEART RATE: 82 BPM | TEMPERATURE: 98.1 F

## 2023-11-28 DIAGNOSIS — R11.0 NAUSEA: ICD-10-CM

## 2023-11-28 DIAGNOSIS — G43.919 INTRACTABLE MIGRAINE WITHOUT STATUS MIGRAINOSUS, UNSPECIFIED MIGRAINE TYPE: Primary | ICD-10-CM

## 2023-11-28 LAB
BASOPHILS # BLD AUTO: 0.1 10E3/UL (ref 0–0.2)
BASOPHILS NFR BLD AUTO: 1 %
EOSINOPHIL # BLD AUTO: 0.1 10E3/UL (ref 0–0.7)
EOSINOPHIL NFR BLD AUTO: 2 %
ERYTHROCYTE [DISTWIDTH] IN BLOOD BY AUTOMATED COUNT: 12.6 % (ref 10–15)
FLUAV AG SPEC QL IA: NEGATIVE
FLUBV AG SPEC QL IA: NEGATIVE
HCT VFR BLD AUTO: 43.1 % (ref 35–47)
HGB BLD-MCNC: 13.9 G/DL (ref 11.7–15.7)
IMM GRANULOCYTES # BLD: 0 10E3/UL
IMM GRANULOCYTES NFR BLD: 0 %
LYMPHOCYTES # BLD AUTO: 2.1 10E3/UL (ref 0.8–5.3)
LYMPHOCYTES NFR BLD AUTO: 37 %
MCH RBC QN AUTO: 30.9 PG (ref 26.5–33)
MCHC RBC AUTO-ENTMCNC: 32.3 G/DL (ref 31.5–36.5)
MCV RBC AUTO: 96 FL (ref 78–100)
MONOCYTES # BLD AUTO: 0.6 10E3/UL (ref 0–1.3)
MONOCYTES NFR BLD AUTO: 12 %
NEUTROPHILS # BLD AUTO: 2.7 10E3/UL (ref 1.6–8.3)
NEUTROPHILS NFR BLD AUTO: 49 %
PLATELET # BLD AUTO: 237 10E3/UL (ref 150–450)
RBC # BLD AUTO: 4.5 10E6/UL (ref 3.8–5.2)
WBC # BLD AUTO: 5.5 10E3/UL (ref 4–11)

## 2023-11-28 PROCEDURE — 36415 COLL VENOUS BLD VENIPUNCTURE: CPT | Performed by: NURSE PRACTITIONER

## 2023-11-28 PROCEDURE — 99214 OFFICE O/P EST MOD 30 MIN: CPT | Performed by: NURSE PRACTITIONER

## 2023-11-28 PROCEDURE — 87635 SARS-COV-2 COVID-19 AMP PRB: CPT | Performed by: NURSE PRACTITIONER

## 2023-11-28 PROCEDURE — 85025 COMPLETE CBC W/AUTO DIFF WBC: CPT | Performed by: NURSE PRACTITIONER

## 2023-11-28 PROCEDURE — 87804 INFLUENZA ASSAY W/OPTIC: CPT | Performed by: NURSE PRACTITIONER

## 2023-11-28 RX ORDER — ONDANSETRON 4 MG/1
4 TABLET, ORALLY DISINTEGRATING ORAL EVERY 8 HOURS PRN
Qty: 10 TABLET | Refills: 0 | Status: SHIPPED | OUTPATIENT
Start: 2023-11-28 | End: 2023-12-08

## 2023-11-28 RX ORDER — IBUPROFEN 200 MG
600 TABLET ORAL ONCE
Status: COMPLETED | OUTPATIENT
Start: 2023-11-28 | End: 2023-11-28

## 2023-11-28 RX ORDER — ONDANSETRON 4 MG/1
4 TABLET, ORALLY DISINTEGRATING ORAL ONCE
Status: COMPLETED | OUTPATIENT
Start: 2023-11-28 | End: 2023-11-28

## 2023-11-28 RX ADMIN — ONDANSETRON 4 MG: 4 TABLET, ORALLY DISINTEGRATING ORAL at 18:58

## 2023-11-28 RX ADMIN — Medication 600 MG: at 18:57

## 2023-11-28 NOTE — LETTER
November 28, 2023      Chanda Champion  [address] [facility] [address]        To Whom It May Concern:    Chanda Champion was seen in our clinic. Please excuse medical related absences. May return to work Friday Dec 1st if improved and tolerated.      Sincerely,        Amy Dale, NP

## 2023-11-28 NOTE — LETTER
November 28, 2023      Chanda Champion  [address] [facility] [address]        To Whom It May Concern:    Chanda Champion was seen in our clinic. Please excuse medical related absences. May return to school Friday Dec 1st if improved and tolerated.      Sincerely,        Amy Dale, NP

## 2023-11-29 NOTE — PROGRESS NOTES
Chief Complaint   Patient presents with    Urgent Care    Migraine     C/O migraine for 2 days     SUBJECTIVE:  Chanda Champion is a 23 year old female who presents to the clinic today a moderate migraine over the last 2 days.  It has been intermittent throbbing to the left upper scalp and bilateral temples.  She relays nausea vomited once dizziness photophobia fatigue myalgias weakness.  Took Tylenol ibuprofen yesterday.  Has had migraines previously.  Lots of viral sick exposures.  Declines nuchal rigidity neurodeficits blunt trauma.    No past medical history on file.  ibuprofen (ADVIL/MOTRIN) 200 MG tablet, Take 3 tablets (600 mg) by mouth every 6 hours as needed for mild pain (Patient not taking: Reported on 11/28/2023)  loratadine-pseudoePHEDrine (CLARITIN-D 12-HOUR) 5-120 MG 12 hr tablet, Take 1 tablet by mouth 2 times daily (Patient not taking: Reported on 11/28/2023)    No current facility-administered medications on file prior to visit.    Social History     Tobacco Use    Smoking status: Never     Passive exposure: Never    Smokeless tobacco: Never   Substance Use Topics    Alcohol use: Never     Allergies   Allergen Reactions    Sesame Oil Swelling       Review of Systems  All systems negative except for those listed above in HPI.    EXAM:   /67   Pulse 82   Temp 98.1  F (36.7  C) (Tympanic)   Wt 99.8 kg (220 lb)   LMP 11/27/2023   BMI 37.76 kg/m      Physical Exam  Vitals reviewed.   Constitutional:       General: She is not in acute distress.     Appearance: Normal appearance. She is well-developed. She is not ill-appearing, toxic-appearing or diaphoretic.   HENT:      Head: Normocephalic and atraumatic.      Right Ear: Tympanic membrane and ear canal normal.      Left Ear: Tympanic membrane and ear canal normal.      Nose: Nose normal.      Mouth/Throat:      Pharynx: No oropharyngeal exudate or posterior oropharyngeal erythema.   Eyes:      Conjunctiva/sclera: Conjunctivae normal.       Pupils: Pupils are equal, round, and reactive to light.   Cardiovascular:      Rate and Rhythm: Normal rate.      Pulses: Normal pulses.   Pulmonary:      Effort: Pulmonary effort is normal. No respiratory distress.      Breath sounds: No stridor. No wheezing.   Abdominal:      General: Abdomen is flat. Bowel sounds are normal.      Palpations: Abdomen is soft.   Musculoskeletal:         General: Normal range of motion.      Cervical back: Normal range of motion and neck supple.   Lymphadenopathy:      Cervical: No cervical adenopathy.   Skin:     General: Skin is warm.      Capillary Refill: Capillary refill takes less than 2 seconds.      Coloration: Skin is not jaundiced or pale.      Findings: No rash.   Neurological:      General: No focal deficit present.      Mental Status: She is alert and oriented to person, place, and time.      Cranial Nerves: No cranial nerve deficit.      Sensory: No sensory deficit.      Motor: No weakness.      Gait: Gait normal.      Comments: Negative Kernig's brudzinski signs.   Psychiatric:         Mood and Affect: Mood normal.         Behavior: Behavior normal.       Results for orders placed or performed in visit on 11/28/23   CBC with platelets and differential     Status: None   Result Value Ref Range    WBC Count 5.5 4.0 - 11.0 10e3/uL    RBC Count 4.50 3.80 - 5.20 10e6/uL    Hemoglobin 13.9 11.7 - 15.7 g/dL    Hematocrit 43.1 35.0 - 47.0 %    MCV 96 78 - 100 fL    MCH 30.9 26.5 - 33.0 pg    MCHC 32.3 31.5 - 36.5 g/dL    RDW 12.6 10.0 - 15.0 %    Platelet Count 237 150 - 450 10e3/uL    % Neutrophils 49 %    % Lymphocytes 37 %    % Monocytes 12 %    % Eosinophils 2 %    % Basophils 1 %    % Immature Granulocytes 0 %    Absolute Neutrophils 2.7 1.6 - 8.3 10e3/uL    Absolute Lymphocytes 2.1 0.8 - 5.3 10e3/uL    Absolute Monocytes 0.6 0.0 - 1.3 10e3/uL    Absolute Eosinophils 0.1 0.0 - 0.7 10e3/uL    Absolute Basophils 0.1 0.0 - 0.2 10e3/uL    Absolute Immature Granulocytes 0.0  <=0.4 10e3/uL   Influenza A & B Antigen - Clinic Collect     Status: Normal    Specimen: Nasopharyngeal; Swab   Result Value Ref Range    Influenza A antigen Negative Negative    Influenza B antigen Negative Negative    Narrative    Test results must be correlated with clinical data. If necessary, results should be confirmed by a molecular assay or viral culture.   CBC with platelets and differential     Status: None    Narrative    The following orders were created for panel order CBC with platelets and differential.  Procedure                               Abnormality         Status                     ---------                               -----------         ------                     CBC with platelets and d...[[id number]]                      Final result                 Please view results for these tests on the individual orders.       ASSESSMENT:    ICD-10-CM    1. Intractable migraine without status migrainosus, unspecified migraine type  G43.919 Symptomatic COVID-19 Virus (Coronavirus) by PCR Nose     Influenza A & B Antigen - Clinic Collect     CBC with platelets and differential     ibuprofen (ADVIL/MOTRIN) tablet 600 mg     ondansetron (ZOFRAN ODT) ODT tab 4 mg     CBC with platelets and differential      2. Nausea  R11.0 ondansetron (ZOFRAN ODT) 4 MG ODT tab        PLAN:    Migraine with status migrainous  CBC unremarkable for overwhelming infection anemia influenza was negative  COVID takes a day, check MyChart otherwise nurses call for positives  Most likely viral induced, also would consider hormonal weather changes stress  Ibuprofen Zofran given in clinic as patient declines Toradol injection  CBC  Negative meningitis signs and no neuro red flags  Rotate Tylenol ibuprofen every 4-6 hours additionally  Push fluids cool dark room rest  ER if severe worsening pain confusion trouble walking talking vomiting excessively neck stiffness high fevers    Follow up with primary care provider with any  problems, questions or concerns or if symptoms worsen or fail to improve. Patient agreed to plan and verbalized understanding.    Amy Dale, GAY-BC  [facility]

## 2023-11-30 LAB — SARS-COV-2 RNA RESP QL NAA+PROBE: NEGATIVE

## 2023-12-18 ENCOUNTER — OFFICE VISIT (OUTPATIENT)
Dept: URGENT CARE | Facility: URGENT CARE | Age: 23
End: 2023-12-18
Payer: COMMERCIAL

## 2023-12-18 VITALS
SYSTOLIC BLOOD PRESSURE: 123 MMHG | TEMPERATURE: 100.1 F | BODY MASS INDEX: 37.56 KG/M2 | DIASTOLIC BLOOD PRESSURE: 84 MMHG | OXYGEN SATURATION: 99 % | HEART RATE: 116 BPM | HEIGHT: 64 IN | WEIGHT: 220 LBS

## 2023-12-18 DIAGNOSIS — J06.9 UPPER RESPIRATORY TRACT INFECTION, UNSPECIFIED TYPE: Primary | ICD-10-CM

## 2023-12-18 DIAGNOSIS — R50.9 FEVER, UNSPECIFIED: ICD-10-CM

## 2023-12-18 LAB
DEPRECATED S PYO AG THROAT QL EIA: NEGATIVE
FLUAV AG SPEC QL IA: NEGATIVE
FLUBV AG SPEC QL IA: NEGATIVE
GROUP A STREP BY PCR: NOT DETECTED

## 2023-12-18 PROCEDURE — 87804 INFLUENZA ASSAY W/OPTIC: CPT | Performed by: PHYSICIAN ASSISTANT

## 2023-12-18 PROCEDURE — 87651 STREP A DNA AMP PROBE: CPT | Performed by: PHYSICIAN ASSISTANT

## 2023-12-18 PROCEDURE — 99213 OFFICE O/P EST LOW 20 MIN: CPT | Performed by: PHYSICIAN ASSISTANT

## 2023-12-18 RX ORDER — BENZONATATE 200 MG/1
200 CAPSULE ORAL 3 TIMES DAILY PRN
Qty: 21 CAPSULE | Refills: 0 | Status: SHIPPED | OUTPATIENT
Start: 2023-12-18

## 2023-12-18 NOTE — LETTER
December 18, 2023      Chanda Champion  [address] [facility] [id number]        To Whom It May Concern:    Chanda Champion  was seen on 12/18/23.  Please excuse her for the next 2-5 days due to illness.        Sincerely,        Yeyo Mullins PA-C

## 2023-12-18 NOTE — PROGRESS NOTES
Fever, unspecified  - Influenza A/B antigen    Upper respiratory tract infection, unspecified type  - benzonatate (TESSALON) 200 MG capsule; Take 1 capsule (200 mg) by mouth 3 times daily as needed for cough  - Streptococcus A Rapid Screen w/Reflex to PCR - Clinic Collect  - Group A Streptococcus PCR Throat Swab    Age 12 months or more  Okay to use Zarbee's   Okay to use Rx Children Tylenol if prescribed (Dose based on weight)    Age 2-12:   Okay to use Children Motrin or Tylenol over the counter.    Adults:  Okay to take acetaminophen 500 mg- 2 tabs (Total of 1000 mg) every 8 hrs   Okay to take ibuprofen 200 mg- 3 tabs (Total of 600 mg) every 6 hours        Okay to use Neti pot for sinus lavage up to three times daily for congestion and sinus pressure if present. Daily hot shower can be beneficial for congestion and body aches. Okay to use bedroom vaporizer or humidifier if symptoms are worse at night. Nightly Vicks Vapor rub and 5-10 mg of Melatonin okay to use for sleep.     Over the counter cough medication and decongestants okay if not prescribed by me during this visit. For homeopathic alternatives to cough syrup and decongestant, feel free to try Elderberry extract.    Okay to use salt water gargles, warm tea (or warm water with lemon and honey), and lozenges for any throat discomfort. Chloraseptic spray is also highly encourages for throat pain/irritation.     Patient will need to get plenty of rest and drink at least 1.5-2 liters of fluids daily for adults and 1-1.5 liters for children. If vomiting and not tolerating liquids for more than 24 hrs, please go to your nearest emergency department for IV fluids and further treatment.     Patient is not contagious after 1 week from start of symptoms. If possible, wear mask for first 7 days. Wash hands regularly and vigorously for 30 seconds often.     Patient was advised to return to clinic for reevaluation (either UC or PCP) if symptoms do not improve in 7  days. Patient educated on red flag symptoms and asked to go directly to the ED if these symptoms present themselves.     [facility]    Subjective   23 year old who presents to clinic today for the following health issues:    Urgent Care, Cough, and Fever       HPI     Acute Illness  Acute illness concerns: Coughing, fever, chills, body aches.   Onset/Duration: Yesterday  Symptoms:  Fever: YES  Chills/Sweats: YES  Headache (location?): YES  Sinus Pressure: YES  Conjunctivitis:  No  Ear Pain: Ear pressure   Rhinorrhea: YES  Congestion: YES  Sore Throat: YES  Cough: YES  Wheeze: No  Decreased Appetite: No  Nausea: No  Vomiting: No  Diarrhea: No  Dysuria/Freq.: No  Dysuria or Hematuria: No  Fatigue/Achiness: YES  Sick/Strep Exposure: Sister has been sick. Home covid-19 test was negative.    Therapies tried and outcome: Tylenol     Review of Systems   Review of Systems   See HPI    Objective    Temp: 100.1  F (37.8  C) Temp src: Oral BP: 123/84 Pulse: 116     SpO2: 99 %       Physical Exam   Physical Exam  Constitutional:       General: She is not in acute distress.     Appearance: Normal appearance. She is normal weight. She is not ill-appearing, toxic-appearing or diaphoretic.   HENT:      Head: Normocephalic and atraumatic.      Right Ear: Tympanic membrane, ear canal and external ear normal. There is no impacted cerumen.      Left Ear: Tympanic membrane, ear canal and external ear normal. There is no impacted cerumen.      Nose: Congestion and rhinorrhea present.      Mouth/Throat:      Mouth: Mucous membranes are moist.      Pharynx: No oropharyngeal exudate or posterior oropharyngeal erythema.   Cardiovascular:      Rate and Rhythm: Normal rate and regular rhythm.      Pulses: Normal pulses.      Heart sounds: Normal heart sounds. No murmur heard.     No friction rub. No gallop.   Pulmonary:      Effort: Pulmonary effort is normal. No respiratory distress.      Breath  sounds: No stridor. No wheezing, rhonchi or rales.   Chest:      Chest wall: No tenderness.   Lymphadenopathy:      Cervical: No cervical adenopathy.   Neurological:      General: No focal deficit present.      Mental Status: She is alert and oriented to person, place, and time. Mental status is at baseline.      Gait: Gait normal.   Psychiatric:         Mood and Affect: Mood normal.         Behavior: Behavior normal.         Thought Content: Thought content normal.         Judgment: Judgment normal.          Results for orders placed or performed in visit on 12/18/23 (from the past 24 hour(s))   Influenza A/B antigen    Specimen: Nose; Swab   Result Value Ref Range    Influenza A antigen Negative Negative    Influenza B antigen Negative Negative    Narrative    Test results must be correlated with clinical data. If necessary, results should be confirmed by a molecular assay or viral culture.   Streptococcus A Rapid Screen w/Reflex to PCR - Clinic Collect    Specimen: Throat; Swab   Result Value Ref Range    Group A Strep antigen Negative Negative

## 2024-11-03 ENCOUNTER — HEALTH MAINTENANCE LETTER (OUTPATIENT)
Age: 24
End: 2024-11-03